# Patient Record
Sex: MALE | Race: WHITE | Employment: OTHER | URBAN - METROPOLITAN AREA
[De-identification: names, ages, dates, MRNs, and addresses within clinical notes are randomized per-mention and may not be internally consistent; named-entity substitution may affect disease eponyms.]

---

## 2024-07-12 ENCOUNTER — HOSPITAL ENCOUNTER (OUTPATIENT)
Age: 49
Discharge: OTHER INSTITUTION | End: 2024-07-12
Payer: COMMERCIAL

## 2024-07-12 VITALS
DIASTOLIC BLOOD PRESSURE: 70 MMHG | SYSTOLIC BLOOD PRESSURE: 117 MMHG | TEMPERATURE: 98 F | HEART RATE: 69 BPM | RESPIRATION RATE: 20 BRPM | OXYGEN SATURATION: 98 %

## 2024-07-12 DIAGNOSIS — R10.13 ABDOMINAL PAIN, EPIGASTRIC: ICD-10-CM

## 2024-07-12 DIAGNOSIS — R10.811 RIGHT UPPER QUADRANT ABDOMINAL TENDERNESS WITHOUT REBOUND TENDERNESS: Primary | ICD-10-CM

## 2024-07-12 RX ORDER — MAGNESIUM HYDROXIDE/ALUMINUM HYDROXICE/SIMETHICONE 120; 1200; 1200 MG/30ML; MG/30ML; MG/30ML
30 SUSPENSION ORAL ONCE
Status: COMPLETED | OUTPATIENT
Start: 2024-07-12 | End: 2024-07-12

## 2024-07-12 RX ORDER — LIDOCAINE HYDROCHLORIDE 20 MG/ML
10 SOLUTION OROPHARYNGEAL ONCE
Status: COMPLETED | OUTPATIENT
Start: 2024-07-12 | End: 2024-07-12

## 2024-07-12 RX ORDER — OMEPRAZOLE 20 MG/1
CAPSULE, DELAYED RELEASE ORAL
COMMUNITY
Start: 2024-04-16

## 2024-07-12 RX ORDER — ONDANSETRON 4 MG/1
4 TABLET, ORALLY DISINTEGRATING ORAL ONCE
Status: COMPLETED | OUTPATIENT
Start: 2024-07-12 | End: 2024-07-12

## 2024-07-12 NOTE — ED PROVIDER NOTES
Patient Seen in: [facility]      History     Chief Complaint   Patient presents with    Gastro-esophageal Reflux     Stated Complaint: BRASH    Subjective:   47 y/o male with GERD presents with c/o acid reflux, nausea and vomiting up food and acid. States for the past few weeks his acid reflux has flared up and not completely resolved despite taking omeprazole.  Patient states for the last 36 hours has had difficulty holding down food.  Tolerating water.  No fever/chills, blood in emesis, diarrhea, constipation, chest pain, shortness of breath.            Objective:   History reviewed. No pertinent past medical history.           No pertinent past surgical history.              No pertinent social history.            Review of Systems   Constitutional:  Negative for chills and fever.   Gastrointestinal:  Positive for abdominal pain, nausea and vomiting.   All other systems reviewed and are negative.      Positive for stated Chief Complaint: Gastro-esophageal Reflux    Other systems are as noted in HPI.  Constitutional and vital signs reviewed.      All other systems reviewed and negative except as noted above.    Physical Exam     ED Triage Vitals [07/12/24 1434]   /70   Pulse 69   Resp 20   Temp 98 °F (36.7 °C)   Temp src Temporal   SpO2 98 %   O2 Device None (Room air)       Current Vitals:   Vital Signs  BP: 117/70  Pulse: 69  Resp: 20  Temp: 98 °F (36.7 °C)  Temp src: Temporal    Oxygen Therapy  SpO2: 98 %  O2 Device: None (Room air)            Physical Exam  Vitals and nursing note reviewed.   Constitutional:       General: He is not in acute distress.     Appearance: Normal appearance. He is not ill-appearing.   Cardiovascular:      Rate and Rhythm: Normal rate and regular rhythm.   Pulmonary:      Effort: Pulmonary effort is normal.   Abdominal:      General: Bowel sounds are normal.      Palpations: Abdomen is soft.      Tenderness: There is abdominal tenderness in the right upper  quadrant and epigastric area.   Skin:     General: Skin is warm and dry.      Capillary Refill: Capillary refill takes less than 2 seconds.   Neurological:      Mental Status: He is alert and oriented to person, place, and time.   Psychiatric:         Behavior: Behavior is cooperative.               ED Course   Labs Reviewed - No data to display                   MDM            Medical Decision Making  Patient is well-appearing. In NAD. Tolerating water   I discussed differentials with patient including but not limited to acid reflux vs cholecystitis   Discussed with patient need for further evaluation in ED to assess for cholecystitis due to RUQ tenderness. Will need LFTs and US  Wife states they are not sure what hospital is in their network and will be able to get an appointment with PCP tomorrow. Discussed risk of waiting for PCP visit and scheduling an US   Both verbalized understanding  Gi cocktail and zofran po given  Wife will call insurance to see if [facility] is in their network.        Problems Addressed:  Abdominal pain, epigastric: acute illness or injury  Right upper quadrant abdominal tenderness without rebound tenderness: acute illness or injury        Disposition and Plan     Clinical Impression:  1. Right upper quadrant abdominal tenderness without rebound tenderness    2. Abdominal pain, epigastric         Disposition:  Ic to ed  7/12/2024  3:11 pm    Follow-up:  No follow-up provider specified.        Medications Prescribed:  Discharge Medication List as of 7/12/2024  3:21 PM

## 2024-07-12 NOTE — ED INITIAL ASSESSMENT (HPI)
Pt here with complaints of GERD, pt states he had been having a lot of acid reflux and is unable to keep food down and is vomiting up acid, pt denies any sob or abd pain

## 2024-09-10 ENCOUNTER — TELEPHONE (OUTPATIENT)
Dept: GASTROENTEROLOGY | Facility: CLINIC | Age: 49
End: 2024-09-10

## 2024-09-10 ENCOUNTER — OFFICE VISIT (OUTPATIENT)
Dept: GASTROENTEROLOGY | Facility: CLINIC | Age: 49
End: 2024-09-10
Payer: COMMERCIAL

## 2024-09-10 VITALS
WEIGHT: 181 LBS | BODY MASS INDEX: 23.99 KG/M2 | HEIGHT: 73 IN | HEART RATE: 70 BPM | DIASTOLIC BLOOD PRESSURE: 67 MMHG | SYSTOLIC BLOOD PRESSURE: 103 MMHG

## 2024-09-10 DIAGNOSIS — Z12.11 COLON CANCER SCREENING: ICD-10-CM

## 2024-09-10 DIAGNOSIS — R13.19 ESOPHAGEAL DYSPHAGIA: Primary | ICD-10-CM

## 2024-09-10 DIAGNOSIS — K21.9 GASTROESOPHAGEAL REFLUX DISEASE, UNSPECIFIED WHETHER ESOPHAGITIS PRESENT: ICD-10-CM

## 2024-09-10 PROCEDURE — 99204 OFFICE O/P NEW MOD 45 MIN: CPT | Performed by: INTERNAL MEDICINE

## 2024-09-10 RX ORDER — PANTOPRAZOLE SODIUM 40 MG/1
40 TABLET, DELAYED RELEASE ORAL
Qty: 180 TABLET | Refills: 3 | Status: SHIPPED | OUTPATIENT
Start: 2024-09-10 | End: 2024-09-16

## 2024-09-10 RX ORDER — SODIUM, POTASSIUM,MAG SULFATES 17.5-3.13G
SOLUTION, RECONSTITUTED, ORAL ORAL
Qty: 177 ML | Refills: 0 | Status: SHIPPED | OUTPATIENT
Start: 2024-09-10

## 2024-09-10 NOTE — H&P
[facility] - Gastroenterology                                                                                                               Reason for consult: esophageal dysphagia, GERD, CRC screening     Requesting physician or provider: ANDREA ISLAS MD    Chief Complaint   Patient presents with    Gastro-esophageal Reflux       HPI:   Johnny Deleon is a 48 year old year-old male with history of GERD here for the following:    Pt has been on esomeprazole or omeprazole intermittently for GERD since 2022 after an EGD showed changes c/w acid reflux.  Pt went to urgent care a few weeks ago given concern for a food bolus not going down along with n/v and uncontrolled heartburn.  Pt wasn't able to keep food down and had n/v but also felt like food was stuck. The food bolus eventually went down, and since then he has been taking Nexium 20 mg daily and famotidine 20 mg at bedtime, which is working OK. He has had esophageal dysphagia intermittently, about once a week or so, for the past 4 years - mostly with dry foods, such as rice.     Currently denies weight loss, f/c, n/v, blood in the stool, change in bowel habits, or any other symptoms.      Pt is a .     Denies family h/o CRC.      Prior endoscopies:  EGD 2022 - there was evidence of reflux and pt was started on a PPI but took it on and off.       Soc:  -former smoker, quit 2015  -denies heavy Etoh  -no illicit drug use    Wt Readings from Last 6 Encounters:   09/10/24 181 lb (82.1 kg)        History, Medications, Allergies, ROS:      Past Medical History:    Cancer (HCC)    skin cancer      Past Surgical History:   Procedure Laterality Date    Egd  01/2022    in [place]      Family Hx: History reviewed. No pertinent family history.   Social History:   Social History     Socioeconomic History    Marital status:    Tobacco Use    Smoking status:  Former     Current packs/day: 0.00     Types: Cigarettes     Quit date:      Years since quittin.6    Smokeless tobacco: Never   Substance and Sexual Activity    Alcohol use: Yes    Drug use: Not Currently     Comment:  quit marijuana use per patient        Medications (Active prior to today's visit):  Current Outpatient Medications   Medication Sig Dispense Refill    pantoprazole 40 MG Oral Tab EC Take 1 tablet (40 mg total) by mouth 2 (two) times daily before meals. 180 tablet 3    Na Sulfate-K Sulfate-Mg Sulf (SUPREP BOWEL PREP KIT) 17.5-3.13-1.6 GM/177ML Oral Solution Take as directed 177 mL 0       Allergies:  No Known Allergies    ROS:   CONSTITUTIONAL:  negative for fevers, rigors  EYES:  negative for diplopia   RESPIRATORY:  negative for severe shortness of breath  CARDIOVASCULAR:  negative for crushing sub-sternal chest pain  GASTROINTESTINAL:  see HPI  GENITOURINARY:  negative for dysuria or gross hematuria  INTEGUMENT/BREAST:  SKIN:  negative for jaundice   ALLERGIC/IMMUNOLOGIC:  negative for hay fever  ENDOCRINE:  negative for cold intolerance and heat intolerance  MUSCULOSKELETAL:  negative for joint effusion/severe erythema  BEHAVIOR/PSYCH:  negative for psychotic behavior      PHYSICAL EXAM:   Blood pressure 103/67, pulse 70, height 6' 1\" (1.854 m), weight 181 lb (82.1 kg).    GEN - Patient appears comfortable and in no acute discomfort  ENT - MMM  EYES - the sclera appears anicteric  CV - no edema  RESP -  No increased work of breathing  ABDOMEN - soft, non-tender exam in all quadrants without rigidity or guarding, non-distended, no abnormal bowel sounds noted, no masses are palpated  SKIN - No jaundice  NEURO - Alert and appropriate, and gross movements of extremities normal  PSYCH - normal affect, non-agitated      Labs/Imaging:     Patient's pertinent labs and imaging were reviewed and discussed with patient today.      .  ASSESSMENT/PLAN:   48 M here for the following:    GERD -  chronic, responds to PPI therapy. Pt was on and off PPIs for the past couple of years and most recently has been on it since his recent episode that led to an urgent care visit, see below.  Will simplify his GERD regimen, see below. Recommend EGD for luminal evaluation.    Esophageal dysphagia - chronic over the past 4 years and intermittent, about once a week. Symptoms have started improve with nexium 20 mg in the AM and pepcid 20 mg at bedtime.  Ddx includes uncontrolled GERD +/- ring, peptic stricture, or less likely neoplasm.  Recommend stopping his current medications for reflux and starting pantoprazole 40 mg BID and EGD for luminal evaluation.  Recommend follow up in ~2 months and/or after the EGD to ensure resolution in symptoms.      CRC screening - the patient is considered average risk for colon cancer, and it is appropriate to proceed with a screening colonoscopy. Patient is currently asymptomatic and denies diarrhea, hematochezia, thin-stools or weight loss. We discussed the risks/benefits/alternatives to the procedure, including CT colonography and stool testing, and he would like to proceed with a colonoscopy.    Recommend:    - stop nexium and pepcid    - start pantoprazole 40 mg BID    - reflux precautions     - EGD and CLN with MAC    - CLD the day prior, NPO after midnight, split dose Suprep      EGD and Colonoscopy consent: I have discussed the risks, benefits, and alternatives to colonoscopy and EGD with the patient/primary decision maker [who demonstrated understanding], including but not limited to the risks of bleeding, infection, pain, death, as well as the risks of anesthesia and perforation all leading to prolonged hospitalization, surgical intervention, or even death. I also mentioned the miss rate of EGD and colonoscopy of 5-10% in the best of all circumstances. The patient has agreed to sign an informed consent and elected to proceed with the procedures with possible intervention [i.e.  polypectomy, stent placement, etc.] as indicated.          Orders This Visit:  No orders of the defined types were placed in this encounter.      Meds This Visit:  Requested Prescriptions     Signed Prescriptions Disp Refills    pantoprazole 40 MG Oral Tab  tablet 3     Sig: Take 1 tablet (40 mg total) by mouth 2 (two) times daily before meals.    Na Sulfate-K Sulfate-Mg Sulf (SUPREP BOWEL PREP KIT) 17.5-3.13-1.6 GM/177ML Oral Solution 177 mL 0     Sig: Take as directed       Imaging & Referrals:  None         Vanna Mann MD          This note was partially prepared using Dragon Medical voice recognition dictation software. As a result, errors may occur. When identified, these errors have been corrected. While every attempt is made to correct errors during dictation, discrepancies may still exist.

## 2024-09-10 NOTE — PATIENT INSTRUCTIONS
PLAN    - Please take pantoprazole 40 mg twice a day     Instructions for the procedure  1. Schedule EGD and colonoscopy with [facility] [Diagnosis: esophageal dysphagia, GERD, CRC screening]    2.  bowel prep from pharmacy (split dose Suprep)    3. Continue all medications for procedure    4. Read all bowel prep instructions carefully    5. AVOID seeds, nuts, popcorn, raw fruits and vegetables (cooked is okay) for 2-3 days before procedure    6. If you start any NEW medication after your visit today, please notify us. Certain medications will need to be held before the procedure, or the procedure cannot be performed.         Things to consider to prevent acid reflux:  Certain foods have been recommended to be removed from the diet as they may aggravate GERD:  - Fatty foods (whole fat dairy, creamy/cheesy sauces, fried/stir fried foods, etc)  - Chocolate  - Mint  - Citrus fruits/citrus fruit juices (lemon, orange, tangerine, pineapple, grapefruit)  - Spicy foods (foods made with garlic, onion, peppers, etc)  - Tomatoes and tomato products (marinara sauce, pizza, salsa, tomato juice, etc)  - Caffeinated/carbonated beverages (coffee, tea, sodas, etc)  - Alcohol  - Ultra processed foods    There is not enough scientific evidence to support that avoiding these foods may reduce GERD.     You can avoid or limit these foods to see if it helps, but there is no need to do this if they do not bother you.    Overall, avoid or limit any food that bothers you. If you can't tell, you can keep a food and symptom diary for a week to identify foods that could be a problem for you.    Lifestyle Habits to Adopt That May Help Reduce Reflux  - Sit up while eating and for one or two hours afterward  - Eat smaller meals  - Avoid eating within 3 hours before bedtime  - Elevate the head of the bed while you are sleeping. You can place a phone book or a foam wedge underneath the mattress to do this.  - Lose weight if you are overweight or  obese  - Exercise   - Reduce stress   - Avoid NSAID medications

## 2024-09-10 NOTE — TELEPHONE ENCOUNTER
Schedulers, see providers orders below:     -Patient was seen in office today and was provided with written and verbal procedure prep instructions, including any medication adjustments.   -Patient was advised to call medical insurance for any questions on benefits and/or any out of pocket costs.   -Patient is aware that the GI schedulers will be calling to schedule procedure(s) and that providers may not have any availability until possibly end of the year.           Instructions for the procedure  1. Schedule EGD and colonoscopy with [facility] [Diagnosis: esophageal dysphagia, GERD, CRC screening]     2.  bowel prep from pharmacy (split dose Suprep)     3. Continue all medications for procedure     4. Read all bowel prep instructions carefully     5. AVOID seeds, nuts, popcorn, raw fruits and vegetables (cooked is okay) for 2-3 days before procedure     6. If you start any NEW medication after your visit today, please notify us. Certain medications will need to be held before the procedure, or the procedure cannot be performed.

## 2024-09-11 NOTE — TELEPHONE ENCOUNTER
Scheduled for:  Colonoscopy 83552/Esophagogastroduodenoscopy 04224    Provider Name:  Dr Mann    Date:  2/10/2025    Location:    [facility]    Sedation:  MAC    Time:  10:15 am (Patient made aware [facility] will call the day before with an arrival time)    Prep:  Suprep    Meds/Allergies Reconciled?:  Physician reviewed     Diagnosis with codes:    Esophageal dysphagia [R13.19]   Gastroesophageal reflux disease, unspecified whether esophagitis present [K21.9]  Colon cancer screening [Z12.11]      Was patient informed to call insurance with codes (Y/N):  Yes, I confirmed [employer] insurance with the patient.     Referral sent?:  N/A    EM or [facility] notified?:  I sent an electronic request to Endo Scheduling and received a confirmation today.      Medication Orders:  This patient verbally confirmed that he is not taking:   Iron, blood thinners, BP meds, and is not diabetic   Not latex allergy, Not PCN allergy and does not have a pacemaker     Misc Orders:       Further instructions given by staff:   I discussed the prep instructions with the patient which he verbally understood and is aware that I will send the instructions today via [employer].    Advised patient:    You will not be able to drive, operate machinery or make critical decisions the day of your procedure. Please make arrangements for transportation. You must have a  (age 18 or older) to accompany you and drive you home after the procedure.  You cannot use public transportation ([employer]ber, Lyft, Taxi). The procedure involves sedation, and you will not be allowed to leave unaccompanied. Your procedure will not proceed forward if you're unable to confirm your  planned to escort you home.       24

## 2024-09-16 ENCOUNTER — TELEPHONE (OUTPATIENT)
Facility: CLINIC | Age: 49
End: 2024-09-16

## 2024-09-16 DIAGNOSIS — K21.9 GASTROESOPHAGEAL REFLUX DISEASE, UNSPECIFIED WHETHER ESOPHAGITIS PRESENT: ICD-10-CM

## 2024-09-16 DIAGNOSIS — R13.19 ESOPHAGEAL DYSPHAGIA: ICD-10-CM

## 2024-09-16 RX ORDER — PANTOPRAZOLE SODIUM 40 MG/1
40 TABLET, DELAYED RELEASE ORAL
Qty: 90 TABLET | Refills: 3 | Status: SHIPPED | OUTPATIENT
Start: 2024-09-16

## 2024-09-16 NOTE — TELEPHONE ENCOUNTER
Wife states pantoprazole 40 MG Oral Tab EC is not covered and wondering if there is an alternative please call

## 2024-09-16 NOTE — TELEPHONE ENCOUNTER
Spoke to wife, Jayce  OK per RAFFAELE    Offered to sent pantoprazole to [facility]  A 90 day supply cost $10    Jayce was agreeable to the plan  I advise Jayce to make an account on the [facility] website using Johnny's email and then we will send the prescription using that email address.    She verbalized understanding and had no further questions  She stated she will update us in a few days to let us know if she was able to fill it    Pantoprazole reordered to [facility]

## 2025-04-26 DIAGNOSIS — K21.9 GASTROESOPHAGEAL REFLUX DISEASE, UNSPECIFIED WHETHER ESOPHAGITIS PRESENT: ICD-10-CM

## 2025-04-26 DIAGNOSIS — R13.19 ESOPHAGEAL DYSPHAGIA: ICD-10-CM

## 2025-04-26 RX ORDER — PANTOPRAZOLE SODIUM 40 MG/1
40 TABLET, DELAYED RELEASE ORAL
Qty: 90 TABLET | Refills: 3 | Status: CANCELLED | OUTPATIENT
Start: 2025-04-26

## 2025-04-29 ENCOUNTER — TELEPHONE (OUTPATIENT)
Facility: CLINIC | Age: 50
End: 2025-04-29

## 2025-04-29 DIAGNOSIS — R13.19 ESOPHAGEAL DYSPHAGIA: ICD-10-CM

## 2025-04-29 DIAGNOSIS — K21.9 GASTROESOPHAGEAL REFLUX DISEASE, UNSPECIFIED WHETHER ESOPHAGITIS PRESENT: ICD-10-CM

## 2025-04-29 RX ORDER — PANTOPRAZOLE SODIUM 40 MG/1
40 TABLET, DELAYED RELEASE ORAL
Qty: 60 TABLET | Refills: 2 | Status: SHIPPED | OUTPATIENT
Start: 2025-04-29

## 2025-04-29 NOTE — TELEPHONE ENCOUNTER
Patient's wife Addi transferred to RN GI triage line    I spoke to Addi (ok per RAFFAELE)    Patient put in a request to have pantoprazole 40 mg refilled    Last office visit: 9/10/2024    Last refill: 9/16/2024    Medication refilled per protocol (requesting medication be sent to [facility])